# Patient Record
Sex: MALE | Race: WHITE
[De-identification: names, ages, dates, MRNs, and addresses within clinical notes are randomized per-mention and may not be internally consistent; named-entity substitution may affect disease eponyms.]

---

## 2018-04-05 ENCOUNTER — HOSPITAL ENCOUNTER (OUTPATIENT)
Dept: HOSPITAL 89 - RAD | Age: 23
End: 2018-04-05
Attending: FAMILY MEDICINE
Payer: COMMERCIAL

## 2018-04-05 DIAGNOSIS — M54.2: Primary | ICD-10-CM

## 2018-04-05 PROCEDURE — 72050 X-RAY EXAM NECK SPINE 4/5VWS: CPT

## 2018-04-05 NOTE — RADIOLOGY IMAGING REPORT
[place]AC[place] [facility] [place] 

 

PATIENT NAME: Jamal Forbes

: 1995

MR: [id number]

V: [id number]

EXAM DATE: 

ORDERING PHYSICIAN: EMERY COY

TECHNOLOGIST: 

 

Location: [facility]

Patient: Jamal Forbes

: 1995

MRN: [id number]

Visit/Account:[id number]

Date of Sevice:  2018

 

ACCESSION #: [id number]

 

EXAMINATION:  Cervical Spine 6 views

 

HISTORY:  C-spine tenderness and numbness in both hands. Fall yesterday.

 

COMPARISON:  None.

 

FINDINGS:

There is loss of the normal cervical lordosis with mild cervical kyphosis centered at C4.

 

Otherwise normal alignment along the cervical spine. No radiographic evidence of acute fracture or montiel
bluxation. Vertebral body height and disc spaces are preserved. The posterior elements appear radiogr
aphically intact.

 

The dens appears radiographically intact. No prevertebral soft tissue swelling.

 

IMPRESSION:

1. Loss of the cervical lordosis may be positional or related to muscle spasm.

2. Otherwise unremarkable cervical spine series. No radiographic evidence of acute fracture or sublux
ation.

 

Report Dictated By: Fidel Prescott MD at 2018 4:37 PM

 

Report E-Signed By: Fidel Prescott MD  at 2018 4:42 PM

 

WSN:[id number]